# Patient Record
Sex: FEMALE | URBAN - METROPOLITAN AREA
[De-identification: names, ages, dates, MRNs, and addresses within clinical notes are randomized per-mention and may not be internally consistent; named-entity substitution may affect disease eponyms.]

---

## 2021-09-15 ENCOUNTER — APPOINTMENT (RX ONLY)
Dept: URBAN - METROPOLITAN AREA CLINIC 44 | Facility: CLINIC | Age: 51
Setting detail: DERMATOLOGY
End: 2021-09-15

## 2021-09-15 DIAGNOSIS — L259 CONTACT DERMATITIS AND OTHER ECZEMA, UNSPECIFIED CAUSE: ICD-10-CM | Status: WORSENING

## 2021-09-15 DIAGNOSIS — L57.3 POIKILODERMA OF CIVATTE: ICD-10-CM | Status: STABLE

## 2021-09-15 PROBLEM — L23.9 ALLERGIC CONTACT DERMATITIS, UNSPECIFIED CAUSE: Status: ACTIVE | Noted: 2021-09-15

## 2021-09-15 PROCEDURE — ? COUNSELING

## 2021-09-15 PROCEDURE — ? PRESCRIPTION MEDICATION MANAGEMENT

## 2021-09-15 PROCEDURE — ? ADDITIONAL NOTES

## 2021-09-15 PROCEDURE — ? FULL BODY SKIN EXAM - DECLINED

## 2021-09-15 PROCEDURE — ? PRESCRIPTION

## 2021-09-15 PROCEDURE — 99203 OFFICE O/P NEW LOW 30 MIN: CPT

## 2021-09-15 PROCEDURE — ? MEDICATION COUNSELING

## 2021-09-15 RX ORDER — HYDROQUINONE 4 %
CREAM (GRAM) TOPICAL
Qty: 30 | Refills: 2 | Status: ERX | COMMUNITY
Start: 2021-09-15

## 2021-09-15 RX ORDER — PREDNISONE 20 MG/1
TABLET ORAL AS DIRECTED
Qty: 7 | Refills: 0 | Status: ERX | COMMUNITY
Start: 2021-09-15

## 2021-09-15 RX ORDER — TRIAMCINOLONE ACETONIDE 1 MG/G
CREAM TOPICAL BID
Qty: 45 | Refills: 0 | Status: ERX | COMMUNITY
Start: 2021-09-15

## 2021-09-15 RX ADMIN — TRIAMCINOLONE ACETONIDE: 1 CREAM TOPICAL at 00:00

## 2021-09-15 RX ADMIN — PREDNISONE: 20 TABLET ORAL at 00:00

## 2021-09-15 RX ADMIN — Medication: at 00:00

## 2021-09-15 ASSESSMENT — LOCATION SIMPLE DESCRIPTION DERM
LOCATION SIMPLE: LEFT ANTERIOR NECK
LOCATION SIMPLE: RIGHT PRETIBIAL REGION
LOCATION SIMPLE: RIGHT ANTERIOR NECK
LOCATION SIMPLE: LEFT PRETIBIAL REGION
LOCATION SIMPLE: CHEST
LOCATION SIMPLE: LEFT FOREARM
LOCATION SIMPLE: RIGHT FOREARM
LOCATION SIMPLE: LEFT THIGH
LOCATION SIMPLE: RIGHT THIGH

## 2021-09-15 ASSESSMENT — LOCATION DETAILED DESCRIPTION DERM
LOCATION DETAILED: LEFT VENTRAL LATERAL PROXIMAL FOREARM
LOCATION DETAILED: LEFT ANTERIOR DISTAL THIGH
LOCATION DETAILED: RIGHT DISTAL PRETIBIAL REGION
LOCATION DETAILED: RIGHT ANTERIOR DISTAL THIGH
LOCATION DETAILED: LEFT LATERAL PROXIMAL PRETIBIAL REGION
LOCATION DETAILED: RIGHT INFERIOR LATERAL NECK
LOCATION DETAILED: LEFT INFERIOR LATERAL NECK
LOCATION DETAILED: RIGHT VENTRAL PROXIMAL FOREARM
LOCATION DETAILED: UPPER STERNUM

## 2021-09-15 ASSESSMENT — LOCATION ZONE DERM
LOCATION ZONE: TRUNK
LOCATION ZONE: LEG
LOCATION ZONE: NECK
LOCATION ZONE: ARM

## 2021-09-15 NOTE — PROCEDURE: PRESCRIPTION MEDICATION MANAGEMENT
Initiate Treatment: hydroquinone 4 % topical cream \\nQuantity: 30.0 g  Days Supply: 30\\nSig: Apply to aa of chest night x 2 months to dark skin on chest
Detail Level: Zone
Render In Strict Bullet Format?: No
Initiate Treatment: prednisone 20 mg tablet As directed\\nQuantity: 7.0 Tablet\\nSig: Take 1 tablet by mouth for 7 days\\n\\ntriamcinolone acetonide 0.1 % topical cream BID\\nQuantity: 45.0 g  Days Supply: 30\\nSig: apply to aa of the body Bid x7days , prn and refrigerate

## 2021-09-15 NOTE — HPI: RASH
What Type Of Note Output Would You Prefer (Optional)?: Bullet Format
How Severe Is Your Rash?: mild
Is This A New Presentation, Or A Follow-Up?: Rash
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 She also has concern about eh discoloration on her neck and wants to ask questions about skincare.

## 2021-09-15 NOTE — PROCEDURE: MEDICATION COUNSELING
Xelrenettaz Pregnancy And Lactation Text: This medication is Pregnancy Category D and is not considered safe during pregnancy.  The risk during breast feeding is also uncertain.

## 2021-09-15 NOTE — PROCEDURE: ADDITIONAL NOTES
Detail Level: Simple
Additional Notes: Patient consent was obtained to proceed with the visit and recommended plan of care after discussion of all risks and benefits, including the risks of COVID-19 exposure.
Additional Notes: Referred to [facility] for further assessment and evaluation.
Render Risk Assessment In Note?: no

## 2022-05-12 ENCOUNTER — APPOINTMENT (RX ONLY)
Dept: URBAN - METROPOLITAN AREA CLINIC 44 | Facility: CLINIC | Age: 52
Setting detail: DERMATOLOGY
End: 2022-05-12

## 2022-05-12 DIAGNOSIS — D69.2 OTHER NONTHROMBOCYTOPENIC PURPURA: ICD-10-CM

## 2022-05-12 PROCEDURE — ? PRESCRIPTION MEDICATION MANAGEMENT

## 2022-05-12 PROCEDURE — ? PRESCRIPTION

## 2022-05-12 PROCEDURE — ? ORDER TESTS

## 2022-05-12 PROCEDURE — ? ADDITIONAL NOTES

## 2022-05-12 PROCEDURE — ? COUNSELING

## 2022-05-12 PROCEDURE — 99214 OFFICE O/P EST MOD 30 MIN: CPT

## 2022-05-12 RX ORDER — TRIAMCINOLONE ACETONIDE 1 MG/G
CREAM TOPICAL
Qty: 453.6 | Refills: 0 | Status: ERX

## 2022-05-12 RX ORDER — PREDNISONE 20 MG/1
TABLET ORAL
Qty: 7 | Refills: 0 | Status: ERX

## 2022-05-12 ASSESSMENT — LOCATION ZONE DERM
LOCATION ZONE: LEG
LOCATION ZONE: ARM

## 2022-05-12 ASSESSMENT — LOCATION DETAILED DESCRIPTION DERM
LOCATION DETAILED: RIGHT VENTRAL PROXIMAL FOREARM
LOCATION DETAILED: LEFT PROXIMAL DORSAL FOREARM
LOCATION DETAILED: LEFT VENTRAL PROXIMAL FOREARM
LOCATION DETAILED: RIGHT DISTAL PRETIBIAL REGION
LOCATION DETAILED: LEFT DISTAL PRETIBIAL REGION
LOCATION DETAILED: RIGHT PROXIMAL DORSAL FOREARM

## 2022-05-12 ASSESSMENT — LOCATION SIMPLE DESCRIPTION DERM
LOCATION SIMPLE: LEFT FOREARM
LOCATION SIMPLE: RIGHT FOREARM
LOCATION SIMPLE: LEFT PRETIBIAL REGION
LOCATION SIMPLE: RIGHT PRETIBIAL REGION

## 2022-05-12 NOTE — PROCEDURE: ORDER TESTS
Bill For Surgical Tray: no
Billing Type: Third-Party Bill
Lab Facility: [id number]
Expected Date Of Service: 05/12/2022
Performing Laboratory: [id number]

## 2022-05-12 NOTE — PROCEDURE: PRESCRIPTION MEDICATION MANAGEMENT
Initiate Treatment: prednisone 20 mg tablet \\nQuantity: 7.0 Tablet  Days Supply: 7\\nSig: Take one tablet by mouth for 7 days\\n\\ntriamcinolone acetonide 0.1 % topical cream \\nQuantity: 453.6 g  Days Supply: 30\\nSig: Apply to aa of body twice daily X 2 weeks then stop
Detail Level: Zone
Render In Strict Bullet Format?: No

## 2022-06-08 ENCOUNTER — APPOINTMENT (RX ONLY)
Dept: URBAN - METROPOLITAN AREA CLINIC 45 | Facility: CLINIC | Age: 52
Setting detail: DERMATOLOGY
End: 2022-06-08

## 2022-06-08 DIAGNOSIS — R21 RASH AND OTHER NONSPECIFIC SKIN ERUPTION: ICD-10-CM | Status: INADEQUATELY CONTROLLED

## 2022-06-08 PROCEDURE — ? FULL BODY SKIN EXAM - DECLINED

## 2022-06-08 PROCEDURE — ? COUNSELING

## 2022-06-08 PROCEDURE — ? ADDITIONAL NOTES

## 2022-06-08 PROCEDURE — ? ORDER TESTS

## 2022-06-08 PROCEDURE — 99214 OFFICE O/P EST MOD 30 MIN: CPT

## 2022-06-08 ASSESSMENT — LOCATION SIMPLE DESCRIPTION DERM
LOCATION SIMPLE: CHEST
LOCATION SIMPLE: LEFT FOREARM
LOCATION SIMPLE: LEFT UPPER BACK
LOCATION SIMPLE: LEFT PRETIBIAL REGION
LOCATION SIMPLE: RIGHT KNEE
LOCATION SIMPLE: RIGHT FOREARM

## 2022-06-08 ASSESSMENT — LOCATION DETAILED DESCRIPTION DERM
LOCATION DETAILED: STERNAL NOTCH
LOCATION DETAILED: RIGHT VENTRAL PROXIMAL FOREARM
LOCATION DETAILED: LEFT MID-UPPER BACK
LOCATION DETAILED: LEFT VENTRAL PROXIMAL FOREARM
LOCATION DETAILED: LEFT PROXIMAL PRETIBIAL REGION
LOCATION DETAILED: RIGHT KNEE

## 2022-06-08 ASSESSMENT — LOCATION ZONE DERM
LOCATION ZONE: ARM
LOCATION ZONE: TRUNK
LOCATION ZONE: LEG

## 2022-06-08 NOTE — PROCEDURE: ORDER TESTS
Expected Date Of Service: 06/08/2022
Performing Laboratory: [id number]
Bill For Surgical Tray: no
Billing Type: Patient Bill

## 2022-06-08 NOTE — PROCEDURE: ADDITIONAL NOTES
Detail Level: Simple
Additional Notes: Patient consent was obtained to proceed with the visit and recommended plan of care after discussion of all risks and benefits, including the risks of COVID-19 exposure.
Render Risk Assessment In Note?: no
Additional Notes: Discussed problems with [facility]; she insists that she wants labwork sent to [facility] but not if her insurance will not pay. She c/o vague symptoms but no rash is presnr.\\nShe previously declined a biopsy when rash was last evaluated.\\nLab requisition was given to her for [facility].\\n

## 2022-07-07 ENCOUNTER — APPOINTMENT (RX ONLY)
Dept: URBAN - METROPOLITAN AREA CLINIC 45 | Facility: CLINIC | Age: 52
Setting detail: DERMATOLOGY
End: 2022-07-07

## 2022-07-07 DIAGNOSIS — M31.9 NECROTIZING VASCULOPATHY, UNSPECIFIED: ICD-10-CM | Status: INADEQUATELY CONTROLLED

## 2022-07-07 DIAGNOSIS — L60.3 NAIL DYSTROPHY: ICD-10-CM

## 2022-07-07 DIAGNOSIS — D18.0 HEMANGIOMA: ICD-10-CM

## 2022-07-07 PROBLEM — D48.5 NEOPLASM OF UNCERTAIN BEHAVIOR OF SKIN: Status: ACTIVE | Noted: 2022-07-07

## 2022-07-07 PROCEDURE — ? ADDITIONAL NOTES

## 2022-07-07 PROCEDURE — ? BIOPSY BY SHAVE METHOD

## 2022-07-07 PROCEDURE — ? LAB REPORTS REVIEWED

## 2022-07-07 PROCEDURE — 11102 TANGNTL BX SKIN SINGLE LES: CPT

## 2022-07-07 PROCEDURE — ? COUNSELING

## 2022-07-07 PROCEDURE — 99214 OFFICE O/P EST MOD 30 MIN: CPT | Mod: 25

## 2022-07-07 ASSESSMENT — LOCATION DETAILED DESCRIPTION DERM
LOCATION DETAILED: LEFT DISTAL PRETIBIAL REGION
LOCATION DETAILED: RIGHT KNEE
LOCATION DETAILED: LEFT PROXIMAL PRETIBIAL REGION
LOCATION DETAILED: RIGHT INDEX FINGERNAIL
LOCATION DETAILED: RIGHT INDEX FINGERNAIL
LOCATION DETAILED: RIGHT DISTAL PRETIBIAL REGION
LOCATION DETAILED: LEFT MIDDLE FINGER LUNULA
LOCATION DETAILED: LEFT MIDDLE FINGERNAIL
LOCATION DETAILED: RIGHT PROXIMAL PRETIBIAL REGION
LOCATION DETAILED: LEFT INDEX FINGERNAIL
LOCATION DETAILED: LEFT INDEX FINGERNAIL
LOCATION DETAILED: LEFT LATERAL SUPERIOR CHEST
LOCATION DETAILED: RIGHT MIDDLE FINGER LUNULA
LOCATION DETAILED: RIGHT MIDDLE FINGERNAIL

## 2022-07-07 ASSESSMENT — LOCATION SIMPLE DESCRIPTION DERM
LOCATION SIMPLE: LEFT PRETIBIAL REGION
LOCATION SIMPLE: RIGHT INDEX FINGERNAIL
LOCATION SIMPLE: RIGHT INDEX FINGERNAIL
LOCATION SIMPLE: RIGHT PRETIBIAL REGION
LOCATION SIMPLE: LEFT MIDDLE FINGERNAIL
LOCATION SIMPLE: CHEST
LOCATION SIMPLE: RIGHT MIDDLE FINGERNAIL
LOCATION SIMPLE: RIGHT KNEE
LOCATION SIMPLE: LEFT INDEX FINGERNAIL
LOCATION SIMPLE: LEFT MIDDLE FINGERNAIL
LOCATION SIMPLE: LEFT INDEX FINGERNAIL
LOCATION SIMPLE: RIGHT MIDDLE FINGERNAIL

## 2022-07-07 ASSESSMENT — LOCATION ZONE DERM
LOCATION ZONE: LEG
LOCATION ZONE: FINGERNAIL
LOCATION ZONE: FINGERNAIL
LOCATION ZONE: TRUNK

## 2022-07-07 ASSESSMENT — NAIL INVOLVEMENT PERCENT: % OF NAIL(S) INVOLVED: 21

## 2022-07-07 ASSESSMENT — SEVERITY ASSESSMENT: SEVERITY: MILD TO MODERATE

## 2022-07-07 NOTE — PROCEDURE: ADDITIONAL NOTES
Detail Level: Simple
Additional Notes: Patient consent was obtained to proceed with the visit and recommended plan of care after discussion of all risks and benefits, including the risks of COVID-19 exposure.
Render Risk Assessment In Note?: no
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 Does resolve with systemic steroids. Extensive panel looking for causes of vasculopathy ordered and all found to be WNL. If worsening and not resolving with time, need to follow closely with us or Rheum.

## 2022-07-07 NOTE — PROCEDURE: LAB REPORTS REVIEWED
Detail Level: Zone
Summarized Lab Results: PT, PTT, Fibrinogen, lupus anticoagulant profile, Protein C/S, antithrombin all WNL.

## 2022-08-15 ENCOUNTER — APPOINTMENT (RX ONLY)
Dept: URBAN - METROPOLITAN AREA CLINIC 44 | Facility: CLINIC | Age: 52
Setting detail: DERMATOLOGY
End: 2022-08-15

## 2022-08-15 ENCOUNTER — RX ONLY (OUTPATIENT)
Age: 52
Setting detail: RX ONLY
End: 2022-08-15

## 2022-08-15 DIAGNOSIS — M31.9 NECROTIZING VASCULOPATHY, UNSPECIFIED: ICD-10-CM

## 2022-08-15 PROBLEM — L30.9 DERMATITIS, UNSPECIFIED: Status: ACTIVE | Noted: 2022-08-15

## 2022-08-15 PROCEDURE — ? PRESCRIPTION

## 2022-08-15 PROCEDURE — 99214 OFFICE O/P EST MOD 30 MIN: CPT

## 2022-08-15 PROCEDURE — ? ADDITIONAL NOTES

## 2022-08-15 PROCEDURE — ? LAB REPORTS REVIEWED

## 2022-08-15 PROCEDURE — ? PRESCRIPTION MEDICATION MANAGEMENT

## 2022-08-15 PROCEDURE — ? COUNSELING

## 2022-08-15 PROCEDURE — ? MEDICATION COUNSELING

## 2022-08-15 RX ORDER — PREDNISONE 20 MG/1
TABLET ORAL
Qty: 7 | Refills: 0 | Status: ERX

## 2022-08-15 RX ORDER — FLUOCINONIDE 0.5 MG/G
OINTMENT TOPICAL
Qty: 60 | Refills: 0 | Status: ERX | COMMUNITY
Start: 2022-08-15

## 2022-08-15 RX ADMIN — FLUOCINONIDE: 0.5 OINTMENT TOPICAL at 00:00

## 2022-08-15 ASSESSMENT — LOCATION DETAILED DESCRIPTION DERM
LOCATION DETAILED: LEFT DISTAL PRETIBIAL REGION
LOCATION DETAILED: RIGHT KNEE
LOCATION DETAILED: RIGHT PROXIMAL PRETIBIAL REGION
LOCATION DETAILED: LEFT PROXIMAL PRETIBIAL REGION
LOCATION DETAILED: RIGHT DISTAL PRETIBIAL REGION

## 2022-08-15 ASSESSMENT — LOCATION SIMPLE DESCRIPTION DERM
LOCATION SIMPLE: RIGHT KNEE
LOCATION SIMPLE: RIGHT PRETIBIAL REGION
LOCATION SIMPLE: LEFT PRETIBIAL REGION

## 2022-08-15 ASSESSMENT — LOCATION ZONE DERM: LOCATION ZONE: LEG

## 2022-08-15 NOTE — PROCEDURE: PRESCRIPTION MEDICATION MANAGEMENT
Detail Level: Zone
Initiate Treatment: fluocinonide 0.05 % topical ointment \\nQuantity: 60.0 g  Days Supply: 30\\nSig: Apply to affected area of the legs twice daily x 2 weeks then prn
Continue Regimen: Prednisone taper. \\nSent to pharmacy.
Render In Strict Bullet Format?: No

## 2022-08-15 NOTE — PROCEDURE: MEDICATION COUNSELING
V-Y Plasty Text: The defect edges were debeveled with a #15 scalpel blade.  Given the location of the defect, shape of the defect and the proximity to free margins an V-Y advancement flap was deemed most appropriate.  Using a sterile surgical marker, an appropriate advancement flap was drawn incorporating the defect and placing the expected incisions within the relaxed skin tension lines where possible.    The area thus outlined was incised deep to adipose tissue with a #15 scalpel blade.  The skin margins were undermined to an appropriate distance in all directions utilizing iris scissors. Olumiant Counseling: I discussed with the patient the risks of Olumiant therapy including but not limited to upper respiratory tract infections, shingles, cold sores, and nausea. Live vaccines should be avoided.  This medication has been linked to serious infections; higher rate of mortality; malignancy and lymphoproliferative disorders; major adverse cardiovascular events; thrombosis; gastrointestinal perforations; neutropenia; lymphopenia; anemia; liver enzyme elevations; and lipid elevations.

## 2022-08-15 NOTE — PROCEDURE: ADDITIONAL NOTES
Additional Notes: Previous concern of vascullitis, today diff include capillaritis , rash resolves with systemic steroids. If rash reoccurres , explained we will needs a biopsy. \\nPt also reported pain on her right calf, referred her to PCP to circulation work up if negative , then refer pt out to rheumatologist for musculoskeletal work up. \\n\\n** pt declined biopsy at the time of the visit. ** \\n\\nwas advised to follow up with a rheumatologist for the muscle joint that pt is experiencing with the rash.\\nCan follow up with :\\n\\nKella Alvarez \\[phone] Additional Notes: Previous concern of vascullitis, today diff include capillaritis , rash resolves with systemic steroids. If rash reoccurres , explained we will needs a biopsy. \\nPt also reported pain on her right calf, referred her to PCP to circulation work up if negative , then refer pt out to rheumatologist for musculoskeletal work up. \\n\\n** pt declined biopsy at the time of the visit. ** \\n\\nwas advised to follow up with a rheumatologist for the muscle joint that pt is experiencing with the rash.\\nCan follow up with :\\n\\nKella Alvarez \\[phone]

## 2022-08-15 NOTE — PROCEDURE: MEDICATION COUNSELING
Rosa Niacinamide Pregnancy And Lactation Text: These medications are considered safe during pregnancy.

## 2022-11-03 ENCOUNTER — RX ONLY (OUTPATIENT)
Age: 52
Setting detail: RX ONLY
End: 2022-11-03

## 2022-11-03 RX ORDER — TRIAMCINOLONE ACETONIDE 1 MG/G
CREAM TOPICAL
Qty: 453.6 | Refills: 0 | Status: ERX

## 2025-04-07 NOTE — PROCEDURE: MEDICATION COUNSELING
[Never] : Never Topical Ketoconazole Counseling: Patient counseled that this medication may cause skin irritation or allergic reactions.  In the event of skin irritation, the patient was advised to reduce the amount of the drug applied or use it less frequently.   The patient verbalized understanding of the proper use and possible adverse effects of ketoconazole.  All of the patient's questions and concerns were addressed.

## 2025-06-05 NOTE — PROCEDURE: MEDICATION COUNSELING
Orders:    TSH with reflex to Free T4 if abnormal; Future     Odomzo Counseling- I discussed with the patient the risks of Odomzo including but not limited to nausea, vomiting, diarrhea, constipation, weight loss, changes in the sense of taste, decreased appetite, muscle spasms, and hair loss.  The patient verbalized understanding of the proper use and possible adverse effects of Odomzo.  All of the patient's questions and concerns were addressed.